# Patient Record
(demographics unavailable — no encounter records)

---

## 2025-02-25 NOTE — HISTORY OF PRESENT ILLNESS
[FreeTextEntry1] : itchy skin, bump [de-identified] : itchy skin duration: years location: legs, arms Bleeding due to excessive scratching.  treatments: otc hydrocortisone   bump location: right underarm Parents noticed bump days ago  Pt of Dr. Khoury  [No Acute Distress] : no acute distress [Normal Oropharynx] : normal oropharynx [Normal Appearance] : normal appearance [No Neck Mass] : no neck mass [Normal Rate/Rhythm] : normal rate/rhythm [Normal S1, S2] : normal s1, s2 [No Murmurs] : no murmurs [No Resp Distress] : no resp distress [Clear to Auscultation Bilaterally] : clear to auscultation bilaterally [No Abnormalities] : no abnormalities [Benign] : benign [Normal Gait] : normal gait [No Clubbing] : no clubbing [No Cyanosis] : no cyanosis [No Edema] : no edema [FROM] : FROM [Normal Color/ Pigmentation] : normal color/ pigmentation [No Focal Deficits] : no focal deficits [Oriented x3] : oriented x3 [Normal Affect] : normal affect

## 2025-02-25 NOTE — ASSESSMENT
[FreeTextEntry1] : 1) Atopic dermatitis, chronic, not at goal, itch out of proportion to rash - Reviewed risks (as well as mitigation strategies for adverse drug events as applicable), benefits, and alternatives of therapy  - Discussed liberal moisturizer use  - May restart triamcinolone ointment prn flares - May consider phototherapy and/or dupilumab if does not improve with optimizing topical use   2) Skin nodule, right axilla - Likely a lymph node. Pilomatricoma may also be considered. Clinically benign - Discussed consideration of an US if it persists/grows  RTC 2-3 months or prn

## 2025-02-25 NOTE — PHYSICAL EXAM
[FreeTextEntry3] : diffuse moderate xerosis. minimal eczematous lesions soft <1 cm nodule on the right axilla

## 2025-07-30 NOTE — REASON FOR VISIT
[Initial Consultation] : an initial consultation for [Mother] : mother [FreeTextEntry2] : hyperactivity [Patient] : patient

## 2025-07-30 NOTE — HISTORY OF PRESENT ILLNESS
[FreeTextEntry1] : Presenting for initial evaluation of hyperactivity   Early development:  Born at 32 weeks due to maternal pre-Eclampsia Discharged at 40 weeks with supplemental O2 after failed car seat test Received Early Intervention, but graduated within a few months Met all developmental milestones without concerns for delay or regression  Educational assessment:  Entering 5th grade, regular classroom Performing well in school No concerns from teachers regarding behavior Not receiving therapies or accommodations  Home assessment: Gets along with family members including younger brother No concerns for hyperactivity, inattention, or impulsivity  Social concern:  Has friends, no significant social conflicts Participates in team sports  Comorbidities: None  Sleep: Sleeping well throughout the night

## 2025-07-30 NOTE — ASSESSMENT
[FreeTextEntry1] : 10 year old with concerns for hyperactivity. Neurologic examination as above. Mother denies any concerns for hyperactivity at home or at school. No further evaluations required.

## 2025-07-30 NOTE — CONSULT LETTER
[Dear  ___] : Dear  [unfilled], [Courtesy Letter:] : I had the pleasure of seeing your patient, [unfilled], in my office today. [Please see my note below.] : Please see my note below. [Consult Closing:] : Thank you very much for allowing me to participate in the care of this patient.  If you have any questions, please do not hesitate to contact me. [Sincerely,] : Sincerely, [FreeTextEntry3] : Obehioya Irumudomon, MD  of Pediatric Neurology Co-Director of Pediatric Neuromuscular Clinic [employer] [facility] [place]

## 2025-07-30 NOTE — PHYSICAL EXAM
[Well-appearing] : well-appearing [Normocephalic] : normocephalic [No dysmorphic facial features] : no dysmorphic facial features [No ocular abnormalities] : no ocular abnormalities [Neck supple] : neck supple [No abnormal neurocutaneous stigmata or skin lesions] : no abnormal neurocutaneous stigmata or skin lesions [Straight] : straight [No deformities] : no deformities [Alert] : alert [Well related, good eye contact] : well related, good eye contact [Conversant] : conversant [Normal speech and language] : normal speech and language [Follows instructions well] : follows instructions well [VFF] : VFF [Pupils reactive to light and accommodation] : pupils reactive to light and accommodation [Full extraocular movements] : full extraocular movements [No nystagmus] : no nystagmus [Normal facial sensation to light touch] : normal facial sensation to light touch [No facial asymmetry or weakness] : no facial asymmetry or weakness [Gross hearing intact] : gross hearing intact [Equal palate elevation] : equal palate elevation [Good shoulder shrug] : good shoulder shrug [Normal tongue movement] : normal tongue movement [Midline tongue, no fasciculations] : midline tongue, no fasciculations [Normal axial and appendicular muscle tone] : normal axial and appendicular muscle tone [Gets up on table without difficulty] : gets up on table without difficulty [No pronator drift] : no pronator drift [Normal finger tapping and fine finger movements] : normal finger tapping and fine finger movements [No abnormal involuntary movements] : no abnormal involuntary movements [5/5 strength in proximal and distal muscles of arms and legs] : 5/5 strength in proximal and distal muscles of arms and legs [2+ biceps] : 2+ biceps [Knee jerks] : knee jerks [Ankle jerks] : ankle jerks [No ankle clonus] : no ankle clonus [Localizes LT and temperature] : localizes LT and temperature [No dysmetria on FTNT] : no dysmetria on FTNT [Good walking balance] : good walking balance [Normal gait] : normal gait [Able to tandem well] : able to tandem well [Negative Romberg] : negative Romberg [de-identified] : no resp distress, no retractions  [de-identified] : walks well

## 2025-07-30 NOTE — BIRTH HISTORY
[At ___ Weeks Gestation] : at [unfilled] weeks gestation [place] [Age Appropriate] : age appropriate developmental milestones met [FreeTextEntry4] : maternal preeclampsia [FreeTextEntry6] : requiring intubation, failed car seat test and discharged on O2